# Patient Record
Sex: MALE | Race: WHITE | Employment: OTHER | URBAN - METROPOLITAN AREA
[De-identification: names, ages, dates, MRNs, and addresses within clinical notes are randomized per-mention and may not be internally consistent; named-entity substitution may affect disease eponyms.]

---

## 2017-01-01 ENCOUNTER — OFFICE VISIT (OUTPATIENT)
Dept: WOUND CARE | Facility: HOSPITAL | Age: 77
End: 2017-01-01
Attending: INTERNAL MEDICINE
Payer: MEDICARE

## 2017-01-01 ENCOUNTER — HOSPITAL ENCOUNTER (OUTPATIENT)
Dept: LAB | Facility: HOSPITAL | Age: 77
Discharge: HOME | End: 2017-01-01
Attending: INTERNAL MEDICINE
Payer: MEDICARE

## 2017-01-01 ENCOUNTER — LAB REQUISITION (OUTPATIENT)
Dept: LAB | Facility: HOSPITAL | Age: 77
End: 2017-01-01
Attending: FAMILY MEDICINE
Payer: MEDICARE

## 2017-01-01 ENCOUNTER — LAB REQUISITION (OUTPATIENT)
Dept: LAB | Facility: HOSPITAL | Age: 77
End: 2017-01-01
Payer: MEDICARE

## 2017-01-01 ENCOUNTER — APPOINTMENT (OUTPATIENT)
Dept: WOUND CARE | Age: 77
End: 2017-01-01
Attending: INTERNAL MEDICINE
Payer: MEDICARE

## 2017-01-01 ENCOUNTER — OFFICE VISIT (OUTPATIENT)
Dept: WOUND CARE | Facility: HOSPITAL | Age: 77
DRG: 871 | End: 2017-01-01
Attending: INTERNAL MEDICINE
Payer: MEDICARE

## 2017-01-01 ENCOUNTER — OFFICE VISIT (OUTPATIENT)
Dept: WOUND CARE | Facility: HOSPITAL | Age: 77
End: 2017-01-01
Attending: INTERNAL MEDICINE
Payer: COMMERCIAL

## 2017-01-01 ENCOUNTER — OFFICE VISIT (OUTPATIENT)
Dept: WOUND CARE | Facility: HOSPITAL | Age: 77
End: 2017-01-01
Attending: SPECIALIST
Payer: MEDICARE

## 2017-01-01 ENCOUNTER — APPOINTMENT (OUTPATIENT)
Dept: CT IMAGING | Facility: HOSPITAL | Age: 77
DRG: 871 | End: 2017-01-01
Attending: HOSPITALIST
Payer: MEDICARE

## 2017-01-01 ENCOUNTER — HOSPITAL ENCOUNTER (OUTPATIENT)
Dept: LAB | Facility: HOSPITAL | Age: 77
Discharge: HOME | End: 2017-01-01
Attending: INTERNAL MEDICINE
Payer: COMMERCIAL

## 2017-01-01 ENCOUNTER — HOSPITAL ENCOUNTER (OUTPATIENT)
Dept: LAB | Facility: HOSPITAL | Age: 77
End: 2017-01-01
Attending: INTERNAL MEDICINE
Payer: MEDICARE

## 2017-01-01 ENCOUNTER — HOSPITAL ENCOUNTER (INPATIENT)
Facility: HOSPITAL | Age: 77
LOS: 4 days | Discharge: NURSING, REHAB OR LONG-TERM CARE | DRG: 871 | End: 2017-01-01
Attending: EMERGENCY MEDICINE | Admitting: HOSPITALIST
Payer: MEDICARE

## 2017-01-01 ENCOUNTER — APPOINTMENT (OUTPATIENT)
Dept: WOUND CARE | Facility: HOSPITAL | Age: 77
End: 2017-01-01
Payer: MEDICARE

## 2017-01-01 VITALS
WEIGHT: 237.38 LBS | RESPIRATION RATE: 20 BRPM | SYSTOLIC BLOOD PRESSURE: 104 MMHG | HEART RATE: 60 BPM | HEIGHT: 72 IN | DIASTOLIC BLOOD PRESSURE: 63 MMHG | OXYGEN SATURATION: 98 % | BODY MASS INDEX: 32.15 KG/M2 | TEMPERATURE: 98 F

## 2017-01-01 DIAGNOSIS — L89.154 STAGE IV PRESSURE ULCER OF SACRAL REGION (HCC): Primary | ICD-10-CM

## 2017-01-01 DIAGNOSIS — Z79.01 LONG TERM CURRENT USE OF ANTICOAGULANT: ICD-10-CM

## 2017-01-01 DIAGNOSIS — L89.623 PRESSURE ULCER OF LEFT HEEL, STAGE 3 (HCC): ICD-10-CM

## 2017-01-01 DIAGNOSIS — Z86.14 HISTORY OF MRSA INFECTION: ICD-10-CM

## 2017-01-01 DIAGNOSIS — Z79.01 LONG TERM (CURRENT) USE OF ANTICOAGULANTS: ICD-10-CM

## 2017-01-01 DIAGNOSIS — L89.154 DECUBITUS ULCER OF COCCYGEAL REGION, STAGE 4 (HCC): Primary | ICD-10-CM

## 2017-01-01 DIAGNOSIS — L89.154 DECUBITUS ULCER OF COCCYGEAL REGION, STAGE 4 (HCC): ICD-10-CM

## 2017-01-01 DIAGNOSIS — L89.154 STAGE IV PRESSURE ULCER OF SACRAL REGION (HCC): ICD-10-CM

## 2017-01-01 DIAGNOSIS — T81.31XA: Primary | ICD-10-CM

## 2017-01-01 DIAGNOSIS — Z51.81 ENCOUNTER FOR THERAPEUTIC DRUG LEVEL MONITORING: ICD-10-CM

## 2017-01-01 DIAGNOSIS — L89.159 DECUBITUS ULCER OF SACRAL REGION, UNSPECIFIED ULCER STAGE: ICD-10-CM

## 2017-01-01 DIAGNOSIS — I95.9 HYPOTENSION, UNSPECIFIED HYPOTENSION TYPE: Primary | ICD-10-CM

## 2017-01-01 DIAGNOSIS — T14.8XXA NONHEALING NONSURGICAL WOUND: Primary | ICD-10-CM

## 2017-01-01 LAB
ALBUMIN SERPL-MCNC: 2.8 G/DL (ref 3.5–4.8)
ALP LIVER SERPL-CCNC: 170 U/L (ref 45–117)
ALT SERPL-CCNC: 37 U/L (ref 17–63)
AST SERPL-CCNC: 41 U/L (ref 15–41)
BASOPHILS # BLD AUTO: 0.07 X10(3) UL (ref 0–0.1)
BASOPHILS # BLD AUTO: 0.08 X10(3) UL (ref 0–0.1)
BASOPHILS NFR BLD AUTO: 0.7 %
BASOPHILS NFR BLD AUTO: 0.7 %
BILIRUB SERPL-MCNC: 0.7 MG/DL (ref 0.1–2)
BUN BLD-MCNC: 44 MG/DL (ref 8–20)
BUN BLD-MCNC: 93 MG/DL (ref 8–20)
CALCIUM BLD-MCNC: 8.3 MG/DL (ref 8.3–10.3)
CALCIUM BLD-MCNC: 8.6 MG/DL (ref 8.3–10.3)
CHLORIDE: 101 MMOL/L (ref 101–111)
CHLORIDE: 102 MMOL/L (ref 101–111)
CO2: 20 MMOL/L (ref 22–32)
CO2: 28 MMOL/L (ref 22–32)
CREAT BLD-MCNC: 2.25 MG/DL (ref 0.7–1.3)
CREAT BLD-MCNC: 2.94 MG/DL (ref 0.7–1.3)
EOSINOPHIL # BLD AUTO: 0.18 X10(3) UL (ref 0–0.3)
EOSINOPHIL # BLD AUTO: 0.53 X10(3) UL (ref 0–0.3)
EOSINOPHIL NFR BLD AUTO: 1.5 %
EOSINOPHIL NFR BLD AUTO: 5.6 %
ERYTHROCYTE [DISTWIDTH] IN BLOOD BY AUTOMATED COUNT: 19.1 % (ref 11.5–16)
ERYTHROCYTE [DISTWIDTH] IN BLOOD BY AUTOMATED COUNT: 19.2 % (ref 11.5–16)
EST. AVERAGE GLUCOSE BLD GHB EST-MCNC: 143 MG/DL (ref 68–126)
GLUCOSE BLD-MCNC: 124 MG/DL (ref 70–99)
GLUCOSE BLD-MCNC: 236 MG/DL (ref 70–99)
HBA1C MFR BLD HPLC: 6.6 % (ref ?–5.7)
HCT VFR BLD AUTO: 31.9 % (ref 37–53)
HCT VFR BLD AUTO: 36.3 % (ref 37–53)
HGB BLD-MCNC: 11.8 G/DL (ref 13–17)
HGB BLD-MCNC: 9.6 G/DL (ref 13–17)
IMMATURE GRANULOCYTE COUNT: 0.11 X10(3) UL (ref 0–1)
IMMATURE GRANULOCYTE COUNT: 0.21 X10(3) UL (ref 0–1)
IMMATURE GRANULOCYTE RATIO %: 0.9 %
IMMATURE GRANULOCYTE RATIO %: 2.2 %
INR BLD: 1.41 (ref 0.89–1.11)
INR BLD: 2.94 (ref 0.89–1.11)
LYMPHOCYTES # BLD AUTO: 0.97 X10(3) UL (ref 0.9–4)
LYMPHOCYTES # BLD AUTO: 1.18 X10(3) UL (ref 0.9–4)
LYMPHOCYTES NFR BLD AUTO: 12.5 %
LYMPHOCYTES NFR BLD AUTO: 8.3 %
M PROTEIN MFR SERPL ELPH: 6.6 G/DL (ref 6.1–8.3)
MCH RBC QN AUTO: 24.8 PG (ref 27–33.2)
MCH RBC QN AUTO: 29.1 PG (ref 27–33.2)
MCHC RBC AUTO-ENTMCNC: 30.1 G/DL (ref 31–37)
MCHC RBC AUTO-ENTMCNC: 32.5 G/DL (ref 31–37)
MCV RBC AUTO: 82.4 FL (ref 80–99)
MCV RBC AUTO: 89.4 FL (ref 80–99)
MONOCYTES # BLD AUTO: 0.78 X10(3) UL (ref 0.1–0.6)
MONOCYTES # BLD AUTO: 0.97 X10(3) UL (ref 0.1–0.6)
MONOCYTES NFR BLD AUTO: 8.2 %
MONOCYTES NFR BLD AUTO: 8.3 %
NEUTROPHIL ABS PRELIM: 6.69 X10 (3) UL (ref 1.3–6.7)
NEUTROPHIL ABS PRELIM: 9.4 X10 (3) UL (ref 1.3–6.7)
NEUTROPHILS # BLD AUTO: 6.69 X10(3) UL (ref 1.3–6.7)
NEUTROPHILS # BLD AUTO: 9.4 X10(3) UL (ref 1.3–6.7)
NEUTROPHILS NFR BLD AUTO: 70.8 %
NEUTROPHILS NFR BLD AUTO: 80.3 %
PLATELET # BLD AUTO: 126 10(3)UL (ref 150–450)
PLATELET # BLD AUTO: 187 10(3)UL (ref 150–450)
POTASSIUM SERPL-SCNC: 4.2 MMOL/L (ref 3.6–5.1)
POTASSIUM SERPL-SCNC: 5.7 MMOL/L (ref 3.6–5.1)
PREALBUMIN: 20.5 MG/DL (ref 20–40)
PSA SERPL DL<=0.01 NG/ML-MCNC: 17.4 SECONDS (ref 12–14.3)
PSA SERPL DL<=0.01 NG/ML-MCNC: 31.3 SECONDS (ref 12–14.3)
RBC # BLD AUTO: 3.87 X10(6)UL (ref 3.8–5.8)
RBC # BLD AUTO: 4.06 X10(6)UL (ref 3.8–5.8)
RED CELL DISTRIBUTION WIDTH-SD: 55.9 FL (ref 35.1–46.3)
RED CELL DISTRIBUTION WIDTH-SD: 63.2 FL (ref 35.1–46.3)
SED RATE-ML: 10 MM/HR (ref 0–12)
SED RATE-ML: 24 MM/HR (ref 0–12)
SODIUM SERPL-SCNC: 130 MMOL/L (ref 136–144)
SODIUM SERPL-SCNC: 138 MMOL/L (ref 136–144)
WBC # BLD AUTO: 11.7 X10(3) UL (ref 4–13)
WBC # BLD AUTO: 9.5 X10(3) UL (ref 4–13)

## 2017-01-01 PROCEDURE — 87070 CULTURE OTHR SPECIMN AEROBIC: CPT

## 2017-01-01 PROCEDURE — 85610 PROTHROMBIN TIME: CPT | Performed by: INTERNAL MEDICINE

## 2017-01-01 PROCEDURE — 74176 CT ABD & PELVIS W/O CONTRAST: CPT | Performed by: HOSPITALIST

## 2017-01-01 PROCEDURE — 99214 OFFICE O/P EST MOD 30 MIN: CPT

## 2017-01-01 PROCEDURE — 87205 SMEAR GRAM STAIN: CPT

## 2017-01-01 PROCEDURE — 84134 ASSAY OF PREALBUMIN: CPT | Performed by: INTERNAL MEDICINE

## 2017-01-01 PROCEDURE — 87186 SC STD MICRODIL/AGAR DIL: CPT

## 2017-01-01 PROCEDURE — 85610 PROTHROMBIN TIME: CPT

## 2017-01-01 PROCEDURE — 99232 SBSQ HOSP IP/OBS MODERATE 35: CPT | Performed by: HOSPITALIST

## 2017-01-01 PROCEDURE — 97597 DBRDMT OPN WND 1ST 20 CM/<: CPT

## 2017-01-01 PROCEDURE — 99223 1ST HOSP IP/OBS HIGH 75: CPT | Performed by: HOSPITALIST

## 2017-01-01 PROCEDURE — 85652 RBC SED RATE AUTOMATED: CPT | Performed by: INTERNAL MEDICINE

## 2017-01-01 PROCEDURE — 87077 CULTURE AEROBIC IDENTIFY: CPT

## 2017-01-01 PROCEDURE — 87184 SC STD DISK METHOD PER PLATE: CPT

## 2017-01-01 PROCEDURE — 36415 COLL VENOUS BLD VENIPUNCTURE: CPT | Performed by: INTERNAL MEDICINE

## 2017-01-01 PROCEDURE — 11042 DBRDMT SUBQ TIS 1ST 20SQCM/<: CPT

## 2017-01-01 PROCEDURE — 97605 NEG PRS WND THER DME<=50SQCM: CPT

## 2017-01-01 PROCEDURE — 87147 CULTURE TYPE IMMUNOLOGIC: CPT

## 2017-01-01 PROCEDURE — 99213 OFFICE O/P EST LOW 20 MIN: CPT

## 2017-01-01 PROCEDURE — 85025 COMPLETE CBC W/AUTO DIFF WBC: CPT | Performed by: INTERNAL MEDICINE

## 2017-01-01 PROCEDURE — 97602 WOUND(S) CARE NON-SELECTIVE: CPT

## 2017-01-01 PROCEDURE — 99239 HOSP IP/OBS DSCHRG MGMT >30: CPT | Performed by: HOSPITALIST

## 2017-01-01 PROCEDURE — 83036 HEMOGLOBIN GLYCOSYLATED A1C: CPT | Performed by: INTERNAL MEDICINE

## 2017-01-01 PROCEDURE — 80053 COMPREHEN METABOLIC PANEL: CPT | Performed by: INTERNAL MEDICINE

## 2017-01-01 PROCEDURE — 80048 BASIC METABOLIC PNL TOTAL CA: CPT | Performed by: INTERNAL MEDICINE

## 2017-01-01 PROCEDURE — 85610 PROTHROMBIN TIME: CPT | Performed by: FAMILY MEDICINE

## 2017-01-01 RX ORDER — ONDANSETRON 2 MG/ML
4 INJECTION INTRAMUSCULAR; INTRAVENOUS EVERY 6 HOURS PRN
Status: DISCONTINUED | OUTPATIENT
Start: 2017-01-01 | End: 2017-01-01

## 2017-01-01 RX ORDER — MULTIPLE VITAMINS W/ MINERALS TAB 9MG-400MCG
1 TAB ORAL DAILY
Status: DISCONTINUED | OUTPATIENT
Start: 2017-01-01 | End: 2017-01-01

## 2017-01-01 RX ORDER — POTASSIUM CHLORIDE 1.5 G/1.77G
20 POWDER, FOR SOLUTION ORAL DAILY
COMMUNITY

## 2017-01-01 RX ORDER — WARFARIN SODIUM 2.5 MG/1
2.5 TABLET ORAL
Status: DISCONTINUED | OUTPATIENT
Start: 2017-01-01 | End: 2017-01-01

## 2017-01-01 RX ORDER — ZINC SULFATE 50(220)MG
220 CAPSULE ORAL DAILY
Status: DISCONTINUED | OUTPATIENT
Start: 2017-01-01 | End: 2017-01-01

## 2017-01-01 RX ORDER — HYDROCODONE BITARTRATE AND ACETAMINOPHEN 10; 325 MG/1; MG/1
1 TABLET ORAL EVERY 6 HOURS PRN
Qty: 30 TABLET | Refills: 0 | Status: SHIPPED | OUTPATIENT
Start: 2017-01-01

## 2017-01-01 RX ORDER — GABAPENTIN 100 MG/1
100 CAPSULE ORAL 3 TIMES DAILY
COMMUNITY

## 2017-01-01 RX ORDER — MELATONIN
325
COMMUNITY

## 2017-01-01 RX ORDER — MELATONIN
325
Status: DISCONTINUED | OUTPATIENT
Start: 2017-01-01 | End: 2017-01-01

## 2017-01-01 RX ORDER — DEXTROSE MONOHYDRATE 25 G/50ML
50 INJECTION, SOLUTION INTRAVENOUS
Status: DISCONTINUED | OUTPATIENT
Start: 2017-01-01 | End: 2017-01-01

## 2017-01-01 RX ORDER — CHOLECALCIFEROL (VITAMIN D3) 125 MCG
1 CAPSULE ORAL DAILY
Status: DISCONTINUED | OUTPATIENT
Start: 2017-01-01 | End: 2017-01-01

## 2017-01-01 RX ORDER — ACETAMINOPHEN 325 MG/1
650 TABLET ORAL EVERY 6 HOURS PRN
Status: DISCONTINUED | OUTPATIENT
Start: 2017-01-01 | End: 2017-01-01

## 2017-01-01 RX ORDER — CEFADROXIL 500 MG/1
500 CAPSULE ORAL 2 TIMES DAILY
Qty: 28 CAPSULE | Refills: 0 | Status: SHIPPED | OUTPATIENT
Start: 2017-01-01 | End: 2017-01-01

## 2017-01-01 RX ORDER — DIGOXIN 125 MCG
125 TABLET ORAL EVERY OTHER DAY
Status: DISCONTINUED | OUTPATIENT
Start: 2017-01-01 | End: 2017-01-01

## 2017-01-01 RX ORDER — FUROSEMIDE 40 MG/1
40 TABLET ORAL 2 TIMES DAILY
COMMUNITY

## 2017-01-01 RX ORDER — DILTIAZEM HYDROCHLORIDE 5 MG/ML
10 INJECTION INTRAVENOUS EVERY 2 HOUR PRN
Status: DISCONTINUED | OUTPATIENT
Start: 2017-01-01 | End: 2017-01-01

## 2017-01-01 RX ORDER — HYDROMORPHONE HYDROCHLORIDE 1 MG/ML
0.4 INJECTION, SOLUTION INTRAMUSCULAR; INTRAVENOUS; SUBCUTANEOUS EVERY 2 HOUR PRN
Status: DISCONTINUED | OUTPATIENT
Start: 2017-01-01 | End: 2017-01-01

## 2017-01-01 RX ORDER — PANTOPRAZOLE SODIUM 20 MG/1
20 TABLET, DELAYED RELEASE ORAL
Status: DISCONTINUED | OUTPATIENT
Start: 2017-01-01 | End: 2017-01-01

## 2017-01-01 RX ORDER — HYDROCODONE BITARTRATE AND ACETAMINOPHEN 10; 325 MG/1; MG/1
1 TABLET ORAL EVERY 4 HOURS PRN
Status: DISCONTINUED | OUTPATIENT
Start: 2017-01-01 | End: 2017-01-01

## 2017-01-01 RX ORDER — METOLAZONE 5 MG/1
5 TABLET ORAL DAILY
COMMUNITY

## 2017-01-01 RX ORDER — FINASTERIDE 5 MG/1
5 TABLET, FILM COATED ORAL DAILY
Status: DISCONTINUED | OUTPATIENT
Start: 2017-01-01 | End: 2017-01-01

## 2017-01-01 RX ORDER — METOPROLOL TARTRATE 5 MG/5ML
1.25 INJECTION INTRAVENOUS EVERY 6 HOURS
Status: DISCONTINUED | OUTPATIENT
Start: 2017-01-01 | End: 2017-01-01

## 2017-01-01 RX ORDER — WARFARIN SODIUM 5 MG/1
5 TABLET ORAL
Status: DISCONTINUED | OUTPATIENT
Start: 2017-01-01 | End: 2017-01-01

## 2017-01-01 RX ORDER — LABETALOL HYDROCHLORIDE 5 MG/ML
10 INJECTION, SOLUTION INTRAVENOUS EVERY 4 HOURS PRN
Status: DISCONTINUED | OUTPATIENT
Start: 2017-01-01 | End: 2017-01-01

## 2017-01-01 RX ORDER — ATORVASTATIN CALCIUM 10 MG/1
10 TABLET, FILM COATED ORAL NIGHTLY
Status: DISCONTINUED | OUTPATIENT
Start: 2017-01-01 | End: 2017-01-01

## 2017-01-01 RX ORDER — TRAMADOL HYDROCHLORIDE 50 MG/1
50 TABLET ORAL EVERY 6 HOURS PRN
COMMUNITY

## 2017-01-01 RX ORDER — SODIUM CHLORIDE 9 MG/ML
INJECTION, SOLUTION INTRAVENOUS CONTINUOUS
Status: DISCONTINUED | OUTPATIENT
Start: 2017-01-01 | End: 2017-01-01

## 2017-01-01 RX ORDER — CARVEDILOL 3.12 MG/1
3.12 TABLET ORAL 2 TIMES DAILY
Status: DISCONTINUED | OUTPATIENT
Start: 2017-01-01 | End: 2017-01-01

## 2017-01-01 RX ORDER — ASCORBIC ACID 500 MG
500 TABLET ORAL DAILY
Status: DISCONTINUED | OUTPATIENT
Start: 2017-01-01 | End: 2017-01-01

## 2017-01-01 RX ORDER — HYDROMORPHONE HYDROCHLORIDE 1 MG/ML
0.2 INJECTION, SOLUTION INTRAMUSCULAR; INTRAVENOUS; SUBCUTANEOUS EVERY 2 HOUR PRN
Status: DISCONTINUED | OUTPATIENT
Start: 2017-01-01 | End: 2017-01-01

## 2017-01-01 RX ORDER — AMIODARONE HYDROCHLORIDE 200 MG/1
200 TABLET ORAL DAILY
Status: DISCONTINUED | OUTPATIENT
Start: 2017-01-01 | End: 2017-01-01

## 2017-01-01 RX ORDER — GABAPENTIN 100 MG/1
100 CAPSULE ORAL 3 TIMES DAILY
Status: DISCONTINUED | OUTPATIENT
Start: 2017-01-01 | End: 2017-01-01

## 2017-01-01 RX ORDER — ASPIRIN 81 MG/1
81 TABLET ORAL DAILY
Status: DISCONTINUED | OUTPATIENT
Start: 2017-01-01 | End: 2017-01-01

## 2017-01-01 RX ORDER — LEVOTHYROXINE SODIUM 88 UG/1
88 TABLET ORAL
Status: DISCONTINUED | OUTPATIENT
Start: 2017-01-01 | End: 2017-01-01

## 2017-01-01 RX ORDER — HYDROMORPHONE HYDROCHLORIDE 1 MG/ML
0.8 INJECTION, SOLUTION INTRAMUSCULAR; INTRAVENOUS; SUBCUTANEOUS EVERY 2 HOUR PRN
Status: DISCONTINUED | OUTPATIENT
Start: 2017-01-01 | End: 2017-01-01

## 2017-01-01 RX ORDER — DIGOXIN 125 MCG
125 TABLET ORAL EVERY OTHER DAY
Status: DISCONTINUED | OUTPATIENT
Start: 2017-01-01 | End: 2017-01-01 | Stop reason: SDUPTHER

## 2017-03-03 NOTE — PROGRESS NOTES
Chief Complaint  This information was obtained from the patient  Patient is here for a Initial visit. Patient states that he had sx with dr. Tucker Fleischer. Patient noticed that his wound was open since 2/1/2017.  He is in an assisted living in [place] and was being vascular disease  Rectal abscess  atherosclerosis of coronary artery  Arrhythmia  Depression  Renal disorder  Sleep apnea    Surgical History  This information was obtained from the patient  Patient has a surgical history of:  KNEE REPLACEMENT - 1/1/1970 Pulse: 60 bpm, Respiratory Rate: 16 breaths/min, Blood Pressure: 110/68 mmHg. Integumentary (Hair, Skin)  Wound #3 Coccyx is a chronic Full Thickness Surgical Wound and has received a status of Not Healed.  Initial wound encounter measurements are 4.5cm summary  Discussed the Plan of Care @ bedside with patient. - D/W FAMILY  Check labs including PT / INR ; CT pelvis   Consult Dr. Salvatore Sanders. If no surgical intervention, will consider Wound VAC.     Laboratory:   Aerobic & Anaerobic Cultures, CBC w/differentia

## 2017-04-27 NOTE — PROGRESS NOTES
Chief Complaint  This information was obtained from the patient  Patient here for f/u of coccyx wound and lower leg wounds and heel wound. 10/10 pain in the coccyx.      Allergies  Acyclovir (Severity: Severe, Reaction: ELEVATED HEART RATE), Valium (React Integumentary (Hair, Skin)  Wound #3 Coccyx is a chronic Stage 4 Pressure Injury Pressure Ulcer and has received a status of Not Healed.  Subsequent wound encounter measurements are 3.5cm length x 1cm width x 4cm depth, with an area of 3.5 sq cm and a v symptoms of infection. Local Pulse is Normal.    Wound #5 Left, Lateral Lower Leg is an acute Full Thickness Edema and has received an outcome of Resolved.  Initial wound encounter measurements are 0cm length x 0cm width with no measurable depth, with an ar anticoagulants  (Encounter Diagnosis) E11.65 - Type 2 diabetes mellitus with hyperglycemia    Diagnoses    ICD-10  L89.154: Pressure ulcer of sacral region, stage 4  Z79.01: Long term (current) use of anticoagulants  E11.65: Type 2 diabetes mellitus with h scheduled.

## 2017-05-05 NOTE — PROGRESS NOTES
Chief Complaint  This information was obtained from the patient  Patient here for f/u of coccyx wound and lower leg wounds and heel wound. Patient has pain to his coxyx wound.  Patient states the cannister was full last night and they have not sent in any shakes  Unintentional weight gain or loss > 5% / month?: No        Objective    Constitutional  Height/Length: 72 in (182.88 cm), Weight: 230 lbs (104.55 kgs), BMI: 31.2, Temperature: 97.8 °F (36.56 °C), Pulse: 60 bpm, Respiratory Rate: 16 breaths/min, Blo The periwound skin did not exhibit: Brawny Induration, Excoriation, Induration, Callus, Crepitus, Fluctuance, Friable, Rash, Dry/Scaly, Maceration, Atrophie [place], Cyanosis, Ecchymosis, Erythema, Hemosiderosis, Pallor, Rubor.  The temperature of the periw performed by Devon Fernando MD. Subcutaneous was removed along with devitalized tissue: slough. The following instrument(s) were used: curette. Pain control was achieved using 4% Lido. A time out was not conducted prior to the start of the procedure.  A with saline  Collagen - endoform  Hydrofera ready  Change dressing every: - 2-3x/week    Wound #7 Left, Anterior Lower Leg     Wound Cleansing & Dressings  Cleanse with saline  Collagen - endoform  Hydrofera ready  Change dressing every: - 2-3x/week    Add

## 2017-05-10 PROBLEM — I95.9 HYPOTENSION, UNSPECIFIED HYPOTENSION TYPE: Status: ACTIVE | Noted: 2017-01-01

## 2017-05-10 PROBLEM — R79.89 AZOTEMIA: Status: ACTIVE | Noted: 2017-01-01

## 2017-05-10 PROBLEM — I95.9 HYPOTENSION: Status: ACTIVE | Noted: 2017-01-01

## 2017-05-10 PROBLEM — D69.6 THROMBOCYTOPENIA (HCC): Status: ACTIVE | Noted: 2017-01-01

## 2017-05-10 PROBLEM — L89.159 DECUBITUS ULCER OF SACRAL REGION, UNSPECIFIED ULCER STAGE: Status: ACTIVE | Noted: 2017-01-01

## 2017-05-10 PROBLEM — D64.9 ANEMIA: Status: ACTIVE | Noted: 2017-01-01

## 2017-05-10 PROBLEM — R73.9 HYPERGLYCEMIA: Status: ACTIVE | Noted: 2017-01-01

## 2017-05-10 NOTE — PLAN OF CARE
NURSING ADMISSION NOTE      Patient admitted via Cart  Oriented to room. Safety precautions initiated. Bed in low position. Call light in reach. Patient admitted to room 427 from the wound center due to hypotension and a possible wound infection.

## 2017-05-10 NOTE — ED PROVIDER NOTES
Patient Seen in: [facility] 4nw-a    History   Patient presents with:  Hypotension (cardiovascular)    Stated Complaint: hypotension    HPI    Patient is a 73-year-old male comes in emergency room for evaluation of hypotension.   Patient was sent fro Renal disorder      ckd stage 4   • Sleep apnea    • Sacral wound            Past Surgical History    CATARACT  2001    Comment bilat    BACK SURGERY  5/5/11    BACK SURGERY  2007    [address]    KNEE REPLACEMENT SURGERY  198 3 (three) times daily before meals. Dose= 10 units if bs<100, 30 units if bs>140   Warfarin Sodium (COUMADIN) 5 MG Oral Tab,  Take 5 mg by mouth As Directed.  Patient taking 5 mg on Sunday, Monday, Tuesday, Wednesday, Friday   Warfarin Sodium (COUMADIN) 2.5 05/10/17 1141 Temporal   SpO2 05/10/17 1141 100 %   O2 Device 05/10/17 1141 None (Room air)       Current:/69 mmHg  Pulse 68  Temp(Src) 97.9 °F (36.6 °C) (Oral)  Resp 18  Ht 182.9 cm (6')  Wt 104.781 kg  BMI 31.32 kg/m2  SpO2 96%        Physical Exam RDW 18.0 (*)     RDW-SD 54.9 (*)     Neutrophil Absolute Prelim 7.40 (*)     Neutrophil Absolute 7.40 (*)     Lymphocyte Absolute 0.73 (*)     Monocyte Absolute 0.68 (*)     All other components within normal limits   TROPONIN I - Normal   DIGOXIN (LANOXIN ulcer stage  Renal failure acute on chronic    Disposition:  Admit    Follow-up:  No follow-up provider specified.     Medications Prescribed:  Current Discharge Medication List        Present on Admission  Date Reviewed: 7/20/2015          ICD-10-CM Noted

## 2017-05-10 NOTE — H&P
[facility]  History and Physical     Maine Townsend.  Patient Status:  Inpatient    1940 MRN [id number]   [facility] 4NW-A Attending Susan Salgado MD   Hosp Day # 0 PCP Rita Peralta MD     Chief Complaint: Patient p peg tube    OTHER SURGICAL HISTORY      Comment sameer filter    OTHER SURGICAL HISTORY      Comment colostomy bag     Social History:  reports that he quit smoking about 33 years ago.  He has never used smokeless tobacco. He reports that he does not dr 1 tablet by mouth daily. Disp:  Rfl:    aspirin 81 MG Oral Tab EC Take 1 tablet by mouth daily. Disp: 30 tablet Rfl: 0   finasteride (PROSCAR) 5 MG Oral Tab Take 1 tablet by mouth daily.  Disp: 30 tablet Rfl: 0   omeprazole (PRILOSEC) 20 MG Oral Capsule Del cyanosis. Integument: Sacral wound with purulent drainage, candidiasis and inguinal region  Psychiatric: Appropriate mood and affect.       Diagnostic Data:      Labs:  Recent Labs   Lab  05/10/17   1206   WBC  9.2   HGB  9.5*   MCV  83.2   PLT  131.0*   I that, after discharge, patient will require TBD.

## 2017-05-10 NOTE — PROGRESS NOTES
[facility] Pharmacy Note: Antimicrobial Weight Dose Adjustment for: Vancocin (vancomycin)    Jamie Gottron. is a 78year old male who has been prescribed Vancocin (vancomycin) 1g IV x1.   CrCl is estimated creatinine clearance is 28.1 mL/min (based on Cr

## 2017-05-10 NOTE — PROGRESS NOTES
Chief Complaint  This information was obtained from the patient  Patient here for f/u of complaining having vac issues and very sore    Allergies  Acyclovir (Severity: Severe, Reaction: ELEVATED HEART RATE), Valium (Reaction: SLEPT FOR 3 DAYS)    HPI  Th except HPI    Weekly Nutrition Assessment    Weekly Nutrition Assessment  Does the patient recieve enteral feedings?: No  Does Patient describe adequate dietary intake?: No  4 servings protein daily?: No  5 servings fruit/veg daily? : No  8 – 10 cups water length x 0.9cm width x 0.1cm depth, with an area of 0.54 sq cm and a volume of 0.054 cubic cm. No tunneling has been noted. No sinus tract has been noted. No undermining has been noted.  There is a small amount of sero-sanguineous drainage noted which has n 0.7cm width with no measurable depth, with an area of 0.35 sq cm . No tunneling has been noted. No sinus tract has been noted. No undermining has been noted. There is a scant amount of serous drainage noted which has no odor.  The patient reports a wound pa

## 2017-05-10 NOTE — ED INITIAL ASSESSMENT (HPI)
Patient sent here from the wound care clinic because of low blood pressure. Per patient, blood pressure at the wound care clinic was 80/50. Patient denies any complaints. Denies dizziness, lightheadedness, chest pain, or SOB.

## 2017-05-10 NOTE — CM/SW NOTE
Asked to speak to patient and son regarding current living conditions. Son is POA. Patient is currently living in [facility] [facility]. Patient was living with his wife until she recently was moved into the memory care unit.   It

## 2017-05-10 NOTE — PROGRESS NOTES
[facility] Pharmacy Note:  Renal Adjustment for Ancef (cefazolin)    Moise Montalvo. is a 78year old male who has been prescribed Ancef (cefazolin) 2 gm IVPB every 8 hrs.   Estimated creatinine clearance is 28.1 mL/min (based on Cr of 2.53) so the dose ha

## 2017-05-11 NOTE — CONSULTS
INFECTIOUS DISEASE CONSULT NOTE    Stephani Caballero.  Patient Status:  Inpatient    1940 MRN [id number]   [facility] 4NW-A Attending Pernell Castleman, MD   [address] Day # 1 PCP Cic wound          Past Surgical History    CATARACT  2001    Comment bilat    BACK SURGERY  5/5/11    BACK SURGERY  2007    BACK SURGERY  1980    BACK SURGERY  1980    KNEE REPLACEMENT SURGERY  1983    Comment l tka    HERNIA SURGERY      Comment doesn't reca bolus 1,000 mL, 1,000 mL, Intravenous, Once  •  Pantoprazole Sodium (PROTONIX) EC tab 20 mg, 20 mg, Oral, QAM AC  •  zinc sulfate (ZINCATE) cap 220 mg, 220 mg, Oral, Daily  •  amiodarone HCl (PACERONE) tab 200 mg, 200 mg, Oral, Daily  •  Levothyroxine Sodi flextouch 50 Units, 50 Units, Subcutaneous, Daily  •  insulin aspart (NOVOLOG) 100 UNIT/ML flexpen 2-10 Units, 2-10 Units, Subcutaneous, TID CC and HS  •  digoxin (LANOXIN) tab 125 mcg, 125 mcg, Oral, QOD    Review of Systems:    Completed.  See pertinent p COMPARISON:  TARA , CT ABDOMEN+PELVIS(CPT=74176), 8/13/2015, 17:59. INDICATIONS:  57-year-old male presents with low blood pressure, history of sacral decubitus ulceration.   TECHNIQUE:  Unenhanced multislice CT scanning was performed from the dome of th dimensions. This appears similar in size to the prior exam. No gross CT evidence of incarceration or strangulation. URINARY BLADDER:  Normal.  No visible focal wall thickening, lesion, or calculus. PELVIC NODES:  Normal.  No adenopathy.   PELVIC ORGANS:  N will continue to follow with you and will make further recommendations based on his progress.     Denice Sinclair  5/11/2017  1:23 PM

## 2017-05-11 NOTE — PLAN OF CARE
GASTROINTESTINAL - ADULT    • Maintains or returns to baseline bowel function Not Progressing      Pt with liquid diarrhea in colostomy, no com plaints of abdominal pain, no cramping noted.      Impaired Activities of Daily Living    • Achieve highest/safes

## 2017-05-11 NOTE — PLAN OF CARE
Patient alert and oriented x4. Patient complaining of toe pain on right foot and given PO norco as ordered. Patient with colostomy with little output today; only small smear in bag. No diarrhea overnight. Patient is negative for c-diff.  Patient on contact

## 2017-05-11 NOTE — PROGRESS NOTES
[facility]  Progress Note     Camila Elam.  Patient Status:  Inpatient    1940 MRN [id number]   [facility] 4NW-A Attending Carly Parekh MD   Hosp Day # 1 PCP Niki Arce MD     Chief Complaint: staph infection Warfarin Sodium  2.5 mg Oral Once per day on Thu Sat   • Warfarin Sodium  5 mg Oral Once per day on Sun Mon Tue Wed Fri   • atorvastatin  10 mg Oral Nightly   • multivitamin with minerals  1 tablet Oral Daily   • ceFAZolin  2 g Intravenous Q12H   • Miconaz bedside.     Jose Luis Mauro MD

## 2017-05-11 NOTE — PROGRESS NOTES
[facility] Pharmacy Note: Antimicrobial Weight Dose Adjustment for: Zosyn (piperacillin/tazobactam)    Loretta Bauer. is a 78year old male who has been prescribed Zosyn (piperacillin/tazobactam) 3.375 gm IV q 8 hrs.   CrCl is estimated creatinine clearan

## 2017-05-11 NOTE — CONSULTS
[facility] dosing service    Initial Pharmacokinetic Consult for Vancomycin Dosing     Vj Frances. is a 78year old male admitted on 5/10 who is being treated for cellulitis, possible early sepsis  Pharmacy has been asked to dose Vancomycin b continue @ 1.5 gm Q 24 hours based upon, actual weight, renal function, and pharmacokinetics. 2.  Pharmacy ordered Vancomycin trough level(s) prior to 4th total dose. Goal trough level 15-20 ug/mL until sepsis ruled out.     3.  Pharmacy will need BUN/

## 2017-05-12 NOTE — PLAN OF CARE
GASTROINTESTINAL - ADULT    • Maintains or returns to baseline bowel function Progressing        GENITOURINARY - ADULT    • Absence of urinary retention Progressing        SKIN/TISSUE INTEGRITY - ADULT    • Incision(s), wounds(s) or drain site(s) healing w

## 2017-05-12 NOTE — PLAN OF CARE
Patient received this am, alert and oriented resting in bed. Lungs clear, diminished, on room air, denies SOB and cough. Abdomen soft, bowel sounds present, soft, non-tender. Voiding adequate amounts per urinal. Dressings changed per wound care.  Ostomy federico

## 2017-05-12 NOTE — PROGRESS NOTES
[facility]  Progress Note     Diane Carl.  Patient Status:  Inpatient    1940 MRN [id number]   [facility] 4NW-A Attending Lashawn Mi MD   Hosp Day # 2 PCP Sebastián Braga MD     Chief Complaint: staph infection 3.125 mg Oral BID   • piperacillin-tazobactam  4.5 g Intravenous Q8H   • sodium chloride 0.9%  1,000 mL Intravenous Once   • Pantoprazole Sodium  20 mg Oral QAM AC   • zinc sulfate  220 mg Oral Daily   • amiodarone HCl  200 mg Oral Daily   • Levothyroxine

## 2017-05-12 NOTE — CM/SW NOTE
BPCI:  Met with patient at bedside to explain the BPCI/Medicare program. Patient agreed with phone f/u for 3 months from [address] [place] [address] after discharge from [facility]. Patient was enrolled under . BPCI/Medicare Letter and Brochure provided.     Patient is

## 2017-05-12 NOTE — PROGRESS NOTES
[address] [place] [address]  TEL: [phone]  FAX: [phone]    Swati Waller.  Patient Status:  Inpatient    1940 MRN [id number]   [facility] 4NW-A Attending Rosemary Stern, [address] [facility] Day # 2 PCP Oc Brady 79-year-old male presents with low blood pressure, history of sacral decubitus ulceration. TECHNIQUE:  Unenhanced multislice CT scanning was performed from the dome of the diaphragm to the pubic symphysis. Dose reduction techniques were used.  Dose inform incarceration or strangulation. URINARY BLADDER:  Normal.  No visible focal wall thickening, lesion, or calculus. PELVIC NODES:  Normal.  No adenopathy. PELVIC ORGANS:  Normal.  No visible mass. Pelvic organs appropriate for patient age.   BONES:  No acu

## 2017-05-12 NOTE — CONSULTS
[facility]  Report of Inpatient Wound Care Consultation    Renata Alvarado.  Patient Status:  Inpatient    1940 MRN [id number]   [facility] 4NW-A Attending Jordan Gutierrez, [address] [facility] Day # 2 PCP Kaia Espinoza MD     Reason for 1 S Jose Ramon El ICD    COLONOSCOPY N/A 5/30/2015    Comment Procedure: COLONOSCOPY;  Surgeon: [place] MD;  Location: [facility] ENDOSCOPY    OTHER SURGICAL HISTORY  7/2006    Comment pacemaker    OTHER SURGIC bed pink, no slough seen. Cleaned wound with NS, wound measured at 4.5 cm L x 1cm W x 3.5cm D. Left heel wound noted, measures 0.6 cm L x 0.9 cm W x 0.1 cm D.  Right heel wound measures 0.5 cm L x 0.7 cm W.   Changed Colostomy bag, note peristomal skin in

## 2017-05-12 NOTE — CM/SW NOTE
05/12/17 1200   CM/SW Screening   Referral Source    Information Source Chart review  (patient)   Patient's Mental Status Alert;Oriented   Patient's Home Environment Assisted Living   Number of Levels in Home 1   Patient lives with Spouse

## 2017-05-13 NOTE — CM/SW NOTE
Spoke with pt's RN and received order that pt needs ROSAMARIA. Discussed with RN that pt does not recommend ROSAMARIA, however MD notes indicate pt may qualify for ROSAMARIA based on his wound care and medical needs.   Pt is requesting to be in the same room as his wife who

## 2017-05-13 NOTE — PROGRESS NOTES
[facility]  Progress Note     Stephani Caballero.  Patient Status:  Inpatient    1940 MRN [id number]   [facility] 4NW-A Attending Pernell Castleman, MD   Hosp Day # 3 PCP Saniya Arrieta MD     Chief Complaint: staph infection breakfast   • gabapentin  100 mg Oral TID   • carvedilol  3.125 mg Oral BID   • piperacillin-tazobactam  4.5 g Intravenous Q8H   • sodium chloride 0.9%  1,000 mL Intravenous Once   • Pantoprazole Sodium  20 mg Oral QAM AC   • zinc sulfate  220 mg Oral Izabella this point Mr. Josh Balderrama is expected to be discharge to: home    Plan of care discussed with patient or family at bedside.     Pascale Pacheco DO

## 2017-05-13 NOTE — CM/SW NOTE
Received call from [facility] with [facility]. He stated that upon review they have determined that pt has used all of his 100 Medicare ROSAMARIA days and has not been out of the hospital or NH for more than 60 days in order to qualify for a new set of days.   A

## 2017-05-13 NOTE — PROGRESS NOTES
[address] [place] [address]  TEL: [phone]  FAX: [phone]    Saul Garcia.  Patient Status:  Inpatient    1940 MRN [id number]   [facility] 4NW-A Attending Michelle Worrell, [address] [facility] Day # 3 SEBASTIEN Garcia facility               - off load              - wound care per wound RN              - optimize nutrition for wound healing. Alb is 2.9              - may benefit from going to [facility] area in his facility so he gets better wound care.  This was d/w son and RN

## 2017-05-14 NOTE — PHYSICAL THERAPY NOTE
PHYSICAL THERAPY QUICK EVALUATION - INPATIENT    Room Number: 427/427-A  Evaluation Date: 5/14/2017  Presenting Problem: Hypotension  Physician Order: PT Eval and Treat    Problem List  Principal Problem:    Hypotension, unspecified hypotension type  Activ ANGIOPLASTY (CORONARY)  1992    ANGIOPLASTY (CORONARY)  1993    ANGIOPLASTY (CORONARY)  1994    CARDIAC DEFIBRILLATOR PLACEMENT      Comment [facility] ICD    COLONOSCOPY N/A 5/30/2015    Comment Procedure: COLONOSCOPY;  Surgeon: Jose Parsons MD;  Leanne Turning over in bed (including adjusting bedclothes, sheets and blankets)?: None   -   Sitting down on and standing up from a chair with arms (e.g., wheelchair, bedside commode, etc.): None   -   Moving from lying on back to sitting on the side of the bed? Discharge Recommendations: Assisted living with PT    PLAN  Patient has been evaluated and presents with no skilled Physical Therapy needs at this time. Patient discharged from Physical Therapy services.   Please re-order if a new functional limitation pre

## 2017-05-14 NOTE — PLAN OF CARE
Patient received alert and oriented, resting in bed, assisted to chair. Lungs clear, diminished on room air. Abdomen soft, bowel sounds present, ostomy appliance intact, stool in bag. Voiding adequate amounts of urine, continent of urine.  Son called nurse

## 2017-05-14 NOTE — DISCHARGE SUMMARY
[facility]  DISCHARGE SUMMARY     Moise Montalvo.  Patient Status:  Inpatient    1940 MRN [id number]   [facility] 4NW-A Attending No att. providers found   Hosp Day # 4 PCP Jayshree Solomon MD     Date of Admission: 5/10/20 due to increased drainage from sacral decubitus ulceration with positive wound culture for MSSA. Infectious disease was consulted and antibiotics started. CT abdomen was negative for osteomyelitis.  Patient was followed by wound care while in hospital. [facility] this was given:  81 mg on 5/14/2017  9:00 AM        Take 1 tablet by mouth daily.     Quantity:  30 tablet   Refills:  0       atorvastatin 10 MG Tabs   Last time this was given:  10 mg on 5/13/2017  8:41 PM   Commonly known as:  LIPITOR        Take 1 table daily.    Quantity:  30 tablet   Refills:  3       MAGNESIUM OR        Take 1 tablet by mouth daily. Refills:  0       metolazone 5 MG Tabs   Commonly known as:  ZAROXOLYN        Take 5 mg by mouth daily.     Refills:  0       MULTI-VITAMIN/MINERALS Tabs -----------------------------------------------------------------------------------------------  PATIENT DISCHARGE INSTRUCTIONS: See electronic chart    Noemi Lombardo DO 5/15/2017    Time spent:  > 30 minutes

## 2017-05-14 NOTE — PROGRESS NOTES
[facility]  Progress Note     Murphy Bassett.  Patient Status:  Inpatient    1940 MRN [id number]   [facility] 4NW-A Attending Afua Roe MD   Hosp Day # 4 PCP Kasey Leblanc MD     Chief Complaint: staph infection mcg Oral Before breakfast   • aspirin  81 mg Oral Daily   • finasteride  5 mg Oral Daily   • Warfarin Sodium  2.5 mg Oral Once per day on Thu Sat   • Warfarin Sodium  5 mg Oral Once per day on Sun Mon Tue Wed Fri   • atorvastatin  10 mg Oral Nightly   • mu

## 2017-06-07 NOTE — PROGRESS NOTES
Chief Complaint  This information was obtained from the patient  Patient here for f/u of complaining of constant pain, increase in drainage and bleeding and is inquiring about a wound vac.      Allergies  Acyclovir (Severity: Severe, Reaction: ELEVATED HE recently moved to an [facility] in [place], and has been to [facility] on and off. he has all new doctors there. he had been to [facility] wound clinic, and was referred here to see Dr. Flaco Siddiqui. Family is interested in surgical closure of the wound. Crepitus, Fluctuance, Rash, Dry/Scaly, Atrophie [place], Cyanosis, Ecchymosis, Hemosiderosis, Pallor, Rubor. The temperature of the periwound skin is WNL. Periwound skin does not exhibit signs or symptoms of infection.     Wound #6 Left Heel is an acute Sta Cyanosis, Ecchymosis, Erythema, Hemosiderosis, Pallor, Rubor. The temperature of the periwound skin is Cool. Periwound skin does not exhibit signs or symptoms of infection. Local Pulse is Weak.     Wound #8 Left, Lateral Heel is an acute Stage 2 Pressure In The following instrument(s) were used: curette. Pain control was achieved using 4% Lido. A time out was not conducted prior to the start of the procedure. A moderate amount of bleeding was controlled with pressure.  The procedure was tolerated well with a p wound center visits during preparation for physician exam if wound bed contains fibrin or eschar. 4% Topical Lidocaine    Wound Cleansing & Dressings  May not shower.   Cleanse with Vashe  Silver alginate  Change dressing 3x/week - SILVER ALGINATE TO BE US

## 2017-06-14 ENCOUNTER — CHARTING TRANS (OUTPATIENT)
Dept: OTHER | Age: 77
End: 2017-06-14

## 2017-06-22 ENCOUNTER — CHARTING TRANS (OUTPATIENT)
Dept: NEPHROLOGY | Age: 77
End: 2017-06-22

## 2017-06-23 NOTE — PROGRESS NOTES
Chief Complaint  This information was obtained from the patient  Patient here for f/u of coccyx and heel ulcer.      Allergies  Acyclovir (Severity: Severe, Reaction: ELEVATED HEART RATE), Valium (Reaction: SLEPT FOR 3 DAYS)    HPI  This information was o been high of late - 7.5  on 2/27/17. But, 3.0 few days ago. he had recently moved to an [facility] in [place], and has been to [facility] on and off. he has all new doctors there.    he had been to [facility] wound clinic, and was referred here to see periwound skin did not exhibit: Brawny Induration, Edema, Callus, Crepitus, Fluctuance, Rash, Dry/Scaly, Atrophie [place], Cyanosis, Ecchymosis, Hemosiderosis, Pallor, Rubor. The temperature of the periwound skin is WNL.  Periwound skin does not exhibit sig Excoriation, Induration, Callus, Crepitus, Fluctuance, Friable, Rash, Dry/Scaly, Moist, Maceration, Atrophie [place], Cyanosis, Ecchymosis, Erythema, Hemosiderosis, Pallor, Rubor. The temperature of the periwound skin is Cool.  Periwound skin does not exhib control was achieved using 4% Lido. A minimal amount of bleeding was controlled with pressure. The procedure was tolerated well with a pain level of 2 throughout and a pain level of 0 following the procedure.  Post Debridement Measurements: 0.7cm length x 0 contains fibrin or eschar.   4% Topical Lidocaine    Wound Cleansing & Dressings  Cleanse with saline  Collagen - endoform  Hydrofera ready  Change dressing 3x/week    Compression Therapy:  Tubigrip - single layer medium compression to BLE  Compression to L

## 2017-06-28 ENCOUNTER — CHARTING TRANS (OUTPATIENT)
Dept: NEPHROLOGY | Age: 77
End: 2017-06-28

## 2017-07-05 NOTE — PROGRESS NOTES
Chief Complaint  This information was obtained from the patient  Patient here for f/u of coccyx and left heel and lower leg wounds. Pt denies any problems with the wounds. Pt stated the wound vac has been leaking a lot.     Allergies  Acyclovir (Severity: wound on his leg and also on the heel. 4-26-17: Coccyx WOund much better with wound vac in place. leg wounds healed    Wound history on admission:  77 yo CM came in for eval and management of open wound in presacral region.  he has had wounds in [facility] are extremity edema TRACE B/L    Integumentary (Hair, Skin)  Wound #3 Coccyx is a chronic Stage 4 Pressure Injury Pressure Ulcer and has received a status of Not Healed.  Subsequent wound encounter measurements are 6.4cm length x 2.5cm width x 6.5cm depth, with the periwound skin is WNL. Periwound skin does not exhibit signs or symptoms of infection. Local Pulse is Normal.    Wound #7 Left, Anterior Lower Leg is an acute Full Thickness Edema and has received a status of Not Healed.  Subsequent wound encounter adriane drainage noted which has no odor. The patient reports a wound pain of level 0/10. The wound margin is well defined. Wound bed has 26-50% slough, 26-50% bright red, pink, firm granulation. The temperature of the periwound skin is WNL.  Periwound skin does left lower leg, subsequent encounter    Diagnoses    ICD-10  L89.154: Pressure ulcer of sacral region, stage 4  Z79.01: Long term (current) use of anticoagulants  E11.65: Type 2 diabetes mellitus with hyperglycemia  L89.613: Pressure ulcer of right heel, s Appointments  Return Appointment in 1 week.      Wound #7 Left, Anterior Lower Leg     Topicals:  Initial Anesthetic Order: Apply lidocaine to wound bed on all future wound center visits during preparation for physician exam if wound bed contains fibrin or

## 2017-07-12 ENCOUNTER — CHARTING TRANS (OUTPATIENT)
Dept: NEPHROLOGY | Age: 77
End: 2017-07-12

## 2017-07-12 NOTE — PROGRESS NOTES
Chief Complaint  This information was obtained from the patient  Patient here for wound care follow up. Complains of constant pain on coccygeal wound. Pain level is 2/10 at this time.     Allergies  Acyclovir (Severity: Severe, Reaction: ELEVATED HEART RATE drainage - increased pain. no fever. There is a new superficial wound on his leg and also on the heel. 4-26-17: Coccyx WOund much better with wound vac in place.    leg wounds healed    Wound history on admission:  79 yo CM came in for eval and managemen status of Not Healed. Subsequent wound encounter measurements are 4.9cm length x 2.5cm width x 6cm depth, with an area of 12.25 sq cm and a volume of 73.5 cubic cm. Tunneling has been noted at 12:00 with a maximum distance of 4cm.  There is a large amount o periwound skin did not exhibit: Brawny Induration, Edema, Excoriation, Induration, Callus, Crepitus, Fluctuance, Friable, Rash, Dry/Scaly, Moist, Maceration, Atrophie [place], Cyanosis, Ecchymosis, Erythema, Hemosiderosis, Pallor, Rubor.  The temperature of hyperglycemia  (Encounter Diagnosis) L89.613 - Pressure ulcer of right heel, stage 3  (Encounter Diagnosis) S81.802D - Unspecified open wound, left lower leg, subsequent encounter    Diagnoses    ICD-10  L89.154: Pressure ulcer of sacral region, stage 4  Z Anesthetic Order: Apply lidocaine to wound bed on all future wound center visits during preparation for physician exam if wound bed contains fibrin or eschar.   4% Topical Lidocaine    Wound Cleansing & Dressings  Cleanse with saline  Thick Foam  Change arcelia

## 2017-07-19 NOTE — PROGRESS NOTES
Chief Complaint  This information was obtained from the patient  Patient here for wound care follow up. Complains of constant pain on coccygeal wound. Pt was not provided with sealed dressings from [address] [place] [address].  Wound vac not applied properly spo cleaning up or taking shower - his primary caregiver was his wife who got moved into memory care unit. He had outpatient PT who recommended that pt moved into SNF for LTC and I agree with that recommendation.    he needs to be in a SNF also for proper off shakes  Unintentional weight gain or loss > 5% / month?: No  Reviewed results w / provider.: Yes    Additional Information  Medication reconciliation completed at today's visit. : Yes        Objective    Constitutional  Height/Length: 72 in (182.88 cm), We and ends at 12:00 with a maximum distance of 0.1cm. There is a small amount of serous drainage noted which has no odor. The patient reports a wound pain of level 0/10. The wound margin is rolled. Wound bed has 51-75% slough, 1-25% pink, spongy granulation. Long term (current) use of anticoagulants  (Encounter Diagnosis) E11.65 - Type 2 diabetes mellitus with hyperglycemia  (Encounter Diagnosis) L89.613 - Pressure ulcer of right heel, stage 3  (Encounter Diagnosis) S81.802D - Unspecified open wound, left lowe Apply lidocaine to wound bed on all future wound center visits during preparation for physician exam if wound bed contains fibrin or eschar.   4% Topical Lidocaine    Wound Cleansing & Dressings  Cleanse with saline  Honey Gel - daily dressing change  Ruby Espinoza

## 2017-07-21 ENCOUNTER — CHARTING TRANS (OUTPATIENT)
Dept: NEPHROLOGY | Age: 77
End: 2017-07-21

## 2017-07-26 NOTE — PROGRESS NOTES
Chief Complaint  This information was obtained from the patient  Patient here for wound care follow up. Pt stated the nurse did not put the wound vac on until Friday at midnight. He stated the nursing home still not following orders.  Sponge from wound va overall - he c/o nausea / vomiting / not feeling good overall. His BP in office I v low.    per son, he has not been doing well - not moving around much - not able to care for self - not cleaning up or taking shower - his primary caregiver was his wife who reconciliation completed at today's visit. : Yes        Objective    Constitutional  Height/Length: 72 in (182.88 cm), Weight: 230 lbs (104.55 kgs), BMI: 31.2, Temperature: 97.9 °F (36.61 °C), Pulse: 60 bpm, Respiratory Rate: 16 breaths/min, Blood Pressure 0/10. The wound margin is rolled. Wound bed has 51-75% slough, 1-25% pink, spongy granulation.    The periwound skin did not exhibit: Brawny Induration, Edema, Excoriation, Induration, Callus, Crepitus, Fluctuance, Friable, Rash, Dry/Scaly, Moist, Maceratio Laura    Follow-Up Appointments  Return Appointment in 2 weeks. Misc/Additional Orders  Supplement with a daily multivitamin. Increase dietary protein - DIETICIAN CONSULT FOR PROTEIN SUPPLEMENTS.    S/S of Infection    Care summary

## 2017-07-28 ENCOUNTER — CHARTING TRANS (OUTPATIENT)
Dept: NEPHROLOGY | Age: 77
End: 2017-07-28

## 2017-08-04 NOTE — PROGRESS NOTES
Chief Complaint  This information was obtained from the patient  Patient here for wound care follow up. Having a lot of pain in the coccyx wound. He also has multiple new wounds and was moved back to skilled nursing care.  He fell out of his wheelchair la good. Wound vac started    6-7-17: patient was hospitalized multiple times, and discharged back to [facility] - he says he cannot afford to be in SNF.  He has home health - He says his wound vac was taken away - unclear why  - His wound is v painful and it is blee No        Objective    Constitutional  Height/Length: 72 in (182.88 cm), Weight: 230 lbs (104.55 kgs), BMI: 31.2, Temperature: 96.6 °F (35.89 °C), Pulse: 60 bpm, Respiratory Rate: 16 breaths/min, Blood Pressure: 135/83 mmHg, Capillary Blood Glucose: 164 mg odor. The patient reports a wound pain of level 0/10. The wound margin is rolled. Wound bed has % slough. The periwound skin exhibited: Moist, Erythema.  The periwound skin did not exhibit: Brawny Induration, Edema, Excoriation, Induration, Callus, Knee is a Full Thickness Trauma Wound and has received a status of Not Healed. Initial wound encounter measurements are 2cm length x 1.2cm width with no measurable depth, with an area of 2.4 sq cm . No tunneling has been noted.  No sinus tract has been note periwound skin color is normal. The temperature of the periwound skin is WNL. Periwound skin does not exhibit signs or symptoms of infection.  Local Pulse is Normal.    Wound #16 Right Forearm is a Partial Thickness Skin Tear and has received a status of No [address] [place] Appointments  Return Appointment in 2 weeks. Misc/Additional Orders  Supplement with a daily multivitamin. Increase dietary protein - DIETICIAN CONSULT FOR PROTEIN SUPPLEMENTS.    S/S of Infection    Care summary  Discussed the

## 2017-08-25 ENCOUNTER — CHARTING TRANS (OUTPATIENT)
Dept: NEPHROLOGY | Age: 77
End: 2017-08-25

## 2017-08-30 ENCOUNTER — CHARTING TRANS (OUTPATIENT)
Dept: NEPHROLOGY | Age: 77
End: 2017-08-30

## 2017-09-01 ENCOUNTER — CHARTING TRANS (OUTPATIENT)
Dept: OTHER | Age: 77
End: 2017-09-01

## 2018-11-28 VITALS
WEIGHT: 214 LBS | HEART RATE: 82 BPM | RESPIRATION RATE: 20 BRPM | DIASTOLIC BLOOD PRESSURE: 62 MMHG | HEIGHT: 72 IN | SYSTOLIC BLOOD PRESSURE: 130 MMHG | BODY MASS INDEX: 28.99 KG/M2 | TEMPERATURE: 97 F | OXYGEN SATURATION: 96 %

## 2018-11-28 VITALS
TEMPERATURE: 98.3 F | WEIGHT: 214 LBS | OXYGEN SATURATION: 97 % | HEART RATE: 68 BPM | SYSTOLIC BLOOD PRESSURE: 138 MMHG | DIASTOLIC BLOOD PRESSURE: 82 MMHG | RESPIRATION RATE: 18 BRPM

## 2018-11-28 VITALS
WEIGHT: 216 LBS | RESPIRATION RATE: 14 BRPM | TEMPERATURE: 97.6 F | SYSTOLIC BLOOD PRESSURE: 107 MMHG | HEART RATE: 57 BPM | OXYGEN SATURATION: 98 % | DIASTOLIC BLOOD PRESSURE: 60 MMHG

## 2018-11-28 VITALS
RESPIRATION RATE: 20 BRPM | BODY MASS INDEX: 29.26 KG/M2 | OXYGEN SATURATION: 97 % | TEMPERATURE: 97 F | HEIGHT: 72 IN | DIASTOLIC BLOOD PRESSURE: 66 MMHG | WEIGHT: 216 LBS | SYSTOLIC BLOOD PRESSURE: 126 MMHG | HEART RATE: 62 BPM

## 2018-11-28 VITALS
RESPIRATION RATE: 16 BRPM | SYSTOLIC BLOOD PRESSURE: 120 MMHG | DIASTOLIC BLOOD PRESSURE: 70 MMHG | WEIGHT: 193 LBS | HEART RATE: 73 BPM | OXYGEN SATURATION: 98 % | TEMPERATURE: 98.7 F

## 2018-11-28 VITALS
SYSTOLIC BLOOD PRESSURE: 132 MMHG | DIASTOLIC BLOOD PRESSURE: 78 MMHG | HEART RATE: 67 BPM | WEIGHT: 193 LBS | RESPIRATION RATE: 18 BRPM | TEMPERATURE: 97 F

## 2018-11-28 VITALS
OXYGEN SATURATION: 97 % | TEMPERATURE: 96.4 F | DIASTOLIC BLOOD PRESSURE: 73 MMHG | RESPIRATION RATE: 18 BRPM | WEIGHT: 218 LBS | SYSTOLIC BLOOD PRESSURE: 128 MMHG | HEART RATE: 59 BPM

## (undated) NOTE — LETTER
05/14/2017    Wound Care Instructions    Sacral wound-cleanse with normal saline, pack with strip of aquacell and apply sacral dressing    Mepilex border to heel wounds

## (undated) NOTE — MR AVS SNAPSHOT
[facility] [place] [address] [address]  [phone]               Thank you for choosing us for your health care visit with [facility].   We are glad to serve you and happy to provide you with this summary of yo Take 40 mg by mouth 2 (two) times daily. Commonly known as:  LASIX           gabapentin 100 MG Caps   Take 100 mg by mouth 3 (three) times daily. Commonly known as:  NEURONTIN           GLUCOSAMINE CHONDROITIN COMPLX OR   Take 1 tablet by mouth daily. Commonly known as:  ZINCATE           * Notice: This list has 2 medication(s) that are the same as other medications prescribed for you. Read the directions carefully, and ask your doctor or other care provider to review them with you.             Results

## (undated) NOTE — MR AVS SNAPSHOT
[facility] [place] [address] [place] [address]  [phone]               Thank you for choosing us for your health care visit with [facility].   We are glad to serve you and happy to provide you with this summary of yo Inject 50 Units into the skin every morning. Commonly known as:  LANTUS           Insulin Lispro 100 UNIT/ML Soln   Inject into the skin 3 (three) times daily before meals.  Dose= 10 units if bs<100, 30 units if bs>140   Commonly known as:  HUMALOG If you have questions, you can call [phone] to talk to our [facility] Staff. Remember, [employer] is NOT to be used for urgent needs. For medical emergencies, dial 911. Visit [email or web address] to learn more.            Visit [facility]

## (undated) NOTE — MR AVS SNAPSHOT
After Visit Summary   3/3/2017    Emiliano Carter.     MRN: [id number]           Visit Information        Provider Department Dept Phone    3/3/2017  2:00 PM [id number]  Wound Care/Hbot [phone]      Allergies as of 3/3/2017  Reviewed on: amiodarone HCl (PACERONE) 200 MG Oral Tab Take 200 mg by mouth daily. digoxin (LANOXIN) 0.125 MG Oral Tab Take 125 mcg by mouth every other day. Vitamin C (VITAMIN C) 500 MG Oral Tab Take 500 mg by mouth daily.     Cholecalciferol (VITAMIN D3) 2000 U Create a Canva password. You can change your password at any time. Choose a Security Question and enter your Answer and click Next. This can be used at a later time if you forget your password. Enter your e-mail address.  You will receive e-mail no

## (undated) NOTE — MR AVS SNAPSHOT
[facility]  [place] [address] [place] [address]  [phone]               Thank you for choosing us for your health care visit with [facility].   We are glad to serve you and happy to provide you with this summary of yo Commonly known as:  LANTUS           Insulin Lispro 100 UNIT/ML Soln   Inject into the skin 3 (three) times daily before meals.  Dose= 10 units if bs<100, 30 units if bs>140   Commonly known as:  HUMALOG           Levothyroxine Sodium 88 MCG Tabs   Take 88 Visit [facility] online at  [email or web address]

## (undated) NOTE — MR AVS SNAPSHOT
[facility]  [place] [address] [place] [address]  [phone]               Thank you for choosing us for your health care visit with [facility].   We are glad to serve you and happy to provide you with this summary of yo Take 1 tablet by mouth every 4 (four) hours as needed. Commonly known as:  NORCO           insulin glargine 100 UNIT/ML Soln   Inject 60 Units into the skin every morning.    Commonly known as:  LANTUS           Insulin Lispro 100 UNIT/ML Soln   Inject in Support Staff. Remember, [employer] is NOT to be used for urgent needs. For medical emergencies, dial 911. Visit [email or web address] to learn more.            Visit [facility] online at  [email or web address]

## (undated) NOTE — IP AVS SNAPSHOT
[facility] [place] [address] [place], [address] ~ [phone]                Discharge Summary   5/10/2017    Renata Alvarado.            Admission Information        Provider Department    5/10/2017 DO Prasanna Mace [id number]-A Take 1 tablet by mouth every 6 (six) hours as needed for Pain.     Cherrie Miller     [    ]    [    ]    [    ]    [    ]       insulin glargine 100 UNIT/ML Soln   Commonly known as:  LANTUS   What changed:  how much to take        Inject 50 Units into the Commonly known as:  LASIX        Take 40 mg by mouth 2 (two) times daily.       [    ]    [    ]    [    ]    [    ]       gabapentin 100 MG Caps   Last time this was given:  100 mg on 5/14/2017  8:58 AM   Commonly known as:  NEURONTIN        Take 100 mg by [    ]       Vitamin C 500 MG Tabs   Commonly known as:  VITAMIN C        Take 500 mg by mouth daily. [    ]    [    ]    [    ]    [    ]       Vitamin D3 2000 units Tabs        Take 1 tablet by mouth daily.       [    ]    [    ]    [    ]    [    ] Recent Hematology Lab Results (cont.)  (Last 3 results in the past 90 days)    Neutrophil % Lymphocyte % Monocyte % Eosinophil % Basophil % Prelim Neut Abs Final Neut Abs Lymphocyte Abso Monocyte Absolu Eosinophil Abso Basophil Absolu    (05/11/17)  73.9 ( coverage. Patient [address] is a Federal Navigator program that can help with your Affordable Care Act coverage, as well as all types of Medicaid plans.   To get signed up and covered, please call [phone] and ask to get set up for an insuran and/or abnormal heart rates/rhythms   Most common side effects: Dizziness or feeling lightheaded (especially with standing), heart rate changes, headaches, nausea/vomiting   What to report to your healthcare team:  Dizziness, nausea, chest pain, weakness, Insulin Lispro, Human, (HUMALOG) 100 UNIT/ML Subcutaneous Solution         Use:  Treat high blood sugar   Most common side effects: Low blood sugar:nausea, jitters, sweating, rapid heartbeat    What to report to your healthcare team:  Low blood sugar (les Most common side effects: Impotence, decreased libido, low blood pressure, gynecomastia   What to report to your healthcare team: Dizziness, breast tissue enlargement, abnormal ejaculation             General Nerve Function Medications     gabapentin 100 M